# Patient Record
Sex: FEMALE | Race: WHITE
[De-identification: names, ages, dates, MRNs, and addresses within clinical notes are randomized per-mention and may not be internally consistent; named-entity substitution may affect disease eponyms.]

---

## 2019-04-12 ENCOUNTER — HOSPITAL ENCOUNTER (EMERGENCY)
Dept: HOSPITAL 59 - ER | Age: 69
Discharge: HOME | End: 2019-04-12
Payer: MEDICARE

## 2019-04-12 DIAGNOSIS — N89.8: ICD-10-CM
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
## 2019-04-12 DIAGNOSIS — S80.11XA: Primary | ICD-10-CM

## 2019-04-12 DIAGNOSIS — E11.9: ICD-10-CM

## 2019-04-12 DIAGNOSIS — I50.9: ICD-10-CM

## 2019-04-12 DIAGNOSIS — J44.9: ICD-10-CM

## 2019-04-12 DIAGNOSIS — N30.01: ICD-10-CM

## 2019-04-12 DIAGNOSIS — I10: ICD-10-CM

## 2019-04-12 LAB
ANION GAP SERPL CALC-SCNC: 13 MMOL/L (ref 7–16)
APPEARANCE UR: CLEAR
BASOPHILS NFR BLD: 0.6 % (ref 0–6)
BILIRUB UR-MCNC: NEGATIVE MG/DL
BUN SERPL-MCNC: 40 MG/DL (ref 8–23)
CO2 SERPL-SCNC: 25 MMOL/L (ref 22–29)
COLOR UR: YELLOW
CREAT SERPL-MCNC: 1.5 MG/DL (ref 0.5–0.9)
EOSINOPHIL NFR BLD: 1.4 % (ref 0–6)
EPI CELLS #/AREA URNS HPF: (no result) /[HPF]
ERYTHROCYTE [DISTWIDTH] IN BLOOD BY AUTOMATED COUNT: 18.7 % (ref 11.5–14.5)
EST GLOMERULAR FILTRATION RATE: 37 ML/MIN
GLUCOSE SERPL-MCNC: 288 MG/DL (ref 74–109)
GLUCOSE UR STRIP-MCNC: NEGATIVE MG/DL
GRANULOCYTES NFR BLD: 58.7 % (ref 47–80)
HCT VFR BLD CALC: 36.8 % (ref 35–47)
HGB BLD-MCNC: 11.2 GM/DL (ref 11.6–16)
KETONES UR QL STRIP: NEGATIVE
LYMPHOCYTES NFR BLD AUTO: 26.7 % (ref 16–45)
MCH RBC QN AUTO: 24.4 PG (ref 27–33)
MCHC RBC AUTO-ENTMCNC: 30.4 G/DL (ref 32–36)
MCV RBC AUTO: 80.2 FL (ref 81–97)
MONOCYTES NFR BLD: 12.6 % (ref 0–9)
NITRITE UR QL STRIP: NEGATIVE
PLATELET # BLD: 196 K/UL (ref 130–400)
PMV BLD AUTO: 10.7 FL (ref 7.4–10.4)
PROT UR QL STRIP: NEGATIVE
RBC # BLD AUTO: 4.59 M/UL (ref 3.8–5.4)
RBC # UR STRIP: (no result) /UL
URINE LEUKOCYTE ESTERASE: (no result)
UROBILINOGEN UR STRIP-ACNC: 0.2 E.U./DL (ref 0.2–1)
WBC # BLD AUTO: 7.1 K/UL (ref 4.2–12.2)

## 2019-04-12 PROCEDURE — 99283 EMERGENCY DEPT VISIT LOW MDM: CPT

## 2019-04-12 PROCEDURE — 85025 COMPLETE CBC W/AUTO DIFF WBC: CPT

## 2019-04-12 PROCEDURE — 81001 URINALYSIS AUTO W/SCOPE: CPT

## 2019-04-12 PROCEDURE — 80048 BASIC METABOLIC PNL TOTAL CA: CPT

## 2019-04-12 NOTE — EMERGENCY DEPARTMENT RECORD
History of Present Illness





- General


Chief Complaint: Fall Injury


Stated Complaint: FALL INJURY


Time Seen by Provider: 19 14:59


Source: Patient, RN notes reviewed


Mode of Arrival: Ambulatory





- History of Present Illness


Onset/Timin


-: Week(s)


When Fall Occurred: # Days PTA


Place Fall Occurred: Home


Loss of Consciousness: None


Prolonged Down Time?: No


Symptoms Prior to Fall: None


Severity: Mild


Severity scale (1-10): 8


Quality: Aching


Context: Tripped/slipped


Associated Symptoms: Denies





- Related Data


 Previous Rx's











 Medication  Instructions  Recorded


 


Cephalexin [Keflex] 500 mg PO QID #40 cap 19











 Allergies











Allergy/AdvReac Type Severity Reaction Status Date / Time


 


No Known Drug Allergies Allergy   Unverified 18 10:46














Travel Screening





- Travel/Exposure Within Last 30 Days


Have you traveled within the last 30 days?: No





Review of Systems


Constitutional: Reports: As per HPI.  Denies: Chills, Fever, Malaise, Night 

sweats, Weakness, Weight change


Eyes: Reports: As per HPI.  Denies: Eye discharge, Eye pain, Photophobia, 

Vision change


ENT: Reports: As per HPI.  Denies: Congestion, Dental pain, Ear pain, Epistaxis

, Hearing loss, Throat pain


Respiratory: Reports: As per HPI.  Denies: Cough, Dyspnea, Hemoptysis, Stridor, 

Wheezes


Cardiovascular: Reports: As per HPI.  Denies: Arrhythmia, Chest pain, Dyspnea 

on exertion, Edema, Murmurs, Orthopnea, Palpitations, Paroxysmal nocturnal 

dyspnea, Rheumatic Fever, Syncope


Endocrine: Reports: As per HPI.  Denies: Fatigue, Heat or cold intolerance, 

Polydipsia, Polyuria


Gastrointestinal: Reports: As per HPI.  Denies: Abdominal pain, Constipation, 

Diarrhea, Hematemesis, Hematochezia, Melena, Nausea, Vomiting


Genitourinary: Reports: As per HPI.  Denies: Abnormal menses, Discharge, 

Dyspareunia, Dysuria, Frequency, Hematuria, Incontinence, Retention, Urgency


Musculoskeletal: Reports: As per HPI.  Denies: Arthralgia, Back pain, Gout, 

Joint swelling, Myalgia, Neck pain


Skin: Reports: As per HPI.  Denies: Bruising, Change in color, Change in hair/

nails, Lesions, Pruritus, Rash


Neurological: Reports: As per HPI.  Denies: Abnormal gait, Confusion, Headache, 

Numbness, Paresthesias, Seizure, Tingling, Tremors, Vertigo, Weakness


Psychiatric: Reports: As per HPI.  Denies: Anxiety, Auditory hallucinations, 

Depression, Homicidal thoughts, Suicidal thoughts, Visual hallucinations


Hematological/Lymphatic: Reports: As per HPI.  Denies: Anemia, Blood Clots, 

Easy bleeding, Easy bruising, Swollen glands





Past Medical History





- SOCIAL HISTORY


Smoking Status: Never smoker





- RESPIRATORY


Hx Respiratory Disorders: Yes


Hx COPD: Yes





- CARDIOVASCULAR


Hx Cardio Disorders: Yes


Hx CHF: Yes


Hx Hypertension: Yes





- NEURO


Hx Neuro Disorders: Yes


Hx TIA: Yes





- GI


Hx GI Disorders: No





- 


Hx Genitourinary Disorders: No





- ENDOCRINE


Hx Endocrine Disorders: Yes


Hx Diabetes: Yes


Hx Thyroid Disease: Yes





- MUSCULOSKELETAL


Hx Musculoskeletal Disorders: Yes


Comment:: bilat. knee replacement





- PSYCH


Hx Psych Problems: Yes


Hx Depression: Yes





- HEMATOLOGY/ONCOLOGY


Hx Hematology/Oncology Disorders: No





Family Medical History


Any Significant Family History?: No





Course





 Vital Signs











  19





  14:51


 


Temperature 97.3 F L


 


Pulse Rate 60


 


Respiratory 16





Rate 


 


Blood Pressure 127/73


 


Pulse Ox 93 L














Medical Decision Making





- Lab Data


Result diagrams: 


 19 15:30





 19 15:30





 Lab Results











  19 Range/Units





  15:30 15:30 15:30 


 


WBC  7.1    (4.2-12.2)  K/uL


 


RBC  4.59    (3.80-5.40)  M/uL


 


Hgb  11.2 L    (11.6-16.0)  gm/dl


 


Hct  36.8    (35.0-47.0)  %


 


MCV  80.2 L    (81-97)  fl


 


MCH  24.4 L    (27-33)  pg


 


MCHC  30.4 L    (32-36)  g/dl


 


RDW  18.7 H    (11.5-14.5)  %


 


Plt Count  196    (130-400)  K/uL


 


MPV  10.7 H    (7.4-10.4)  fl


 


Gran %  58.7    (47-80)  %


 


Lymphocytes %  26.7    (16-45)  %


 


Monocytes %  12.6 H    (0-9)  %


 


Eosinophils %  1.4    (0-6)  %


 


Basophils %  0.6    (0-6)  %


 


Sodium    132 L  (136-145)  mmol/L


 


Potassium    4.6 H  (3.4-4.5)  mmol/L


 


Chloride    94 L  ()  mmol/L


 


Carbon Dioxide    25.0  (22-29)  mmol/L


 


Anion Gap    13.0  (7-16)  


 


BUN    40 H  (8-23)  mg/dL


 


Creatinine    1.5 H  (0.5-0.9)  mg/dL


 


Estimated GFR    37  mL/min


 


Random Glucose    288 H  ()  mg/dL


 


Calcium    9.1  (8.8-10.2)  mg/dL


 


Urine Color   Yellow   


 


Urine Appearance   Clear   


 


Urine pH   6.5   (5.0-8.0)  


 


Ur Specific Gravity   <= 1.005   (1.002-1.030)  


 


Urine Protein   Negative   (NEGATIVE)  


 


Urine Glucose (UA)   Negative   (NEGATIVE)  


 


Urine Ketones   Negative   (NEGATIVE)  


 


Urine Blood   Large H   (NEGATIVE)  


 


Urine Nitrite   Negative   (NEGATIVE)  


 


Urine Bilirubin   Negative   (NEGATIVE)  


 


Urine Urobilinogen   0.2   (0.20 - 1.00)  E.U./dL


 


Ur Leukocyte Esterase   Moderate H   (NEGATIVE)  


 


Urine RBC   10 - 15   (NONE SEEN)  


 


Urine WBC   10 - 15   (0-2/hpf)  


 


Ur Epithelial Cells   0 - 2   (FEW)  














Disposition


Clinical Impression: 


 Vaginal laceration, old





Hematoma of leg


Qualifiers:


 Encounter type: initial encounter Laterality: right Qualified Code(s): 

S80.11XA - Contusion of right lower leg, initial encounter





UTI (urinary tract infection)


Qualifiers:


 Urinary tract infection type: acute cystitis Hematuria presence: with 

hematuria Qualified Code(s): N30.01 - Acute cystitis with hematuria





Disposition: Home, Self-Care


Condition: (1) Good


Instructions:  Fall Prevention for Older Adults (ED)


Additional Instructions: 


neosporin ointment twice a day


follow up with family DR in one week


Prescriptions: 


Cephalexin [Keflex] 500 mg PO QID #40 cap


Forms:  Patient Portal Access


Time of Disposition: 16:13





Quality





- Quality Measures


Quality Measures: N/A





- Blood Pressure Screening


Does Patient Have Any of the Following: No, Active Dx of HTN


Blood Pressure Classification: Pre-Hypertensive BP Reading


Systolic Measurement: 127


Diastolic Measurement: 73


Screening for High Blood Pressure: Patient Exclusion, Hx of HTN []

## 2019-04-12 NOTE — EMERGENCY DEPARTMENT RECORD
History of Present Illness





- General


Chief Complaint: Fall Injury


Stated Complaint: FALL INJURY


Time Seen by Provider: 19 14:59


Source: Patient, RN notes reviewed


Mode of Arrival: Ambulatory





- History of Present Illness


Initial Comments: 


patient fell one week ago and seen at [facility] and she said her visiting nurse 

told her to go to the ED and have her leg checked because she was concerned 

about it being warm and she sees Dr Smith for history of hematuria and she 

had a knight while in the hospital. patient had a cystoscopy about 2 years ago. 

Her right leg hematoma looks good but she talks mention she sees blood on her 

under ware seen on and off since the fall.  Patient said at [facility] she was 

told she was low on iron. 





MD Complaint: Fall


Onset/Timin


-: Week(s)


When Fall Occurred: # Days PTA


Place Fall Occurred: Home


Loss of Consciousness: None


Prolonged Down Time?: No


Symptoms Prior to Fall: None


Severity: Mild


Severity scale (1-10): 8


Quality: Aching


Context: Tripped/slipped


Associated Symptoms: Denies





- Related Data


 Allergies











Allergy/AdvReac Type Severity Reaction Status Date / Time


 


No Known Drug Allergies Allergy   Unverified 18 10:46














Travel Screening





- Travel/Exposure Within Last 30 Days


Have you traveled within the last 30 days?: No





Review of Systems


Reviewed: No additional complaints except as noted below


Constitutional: Reports: As per HPI.  Denies: Chills, Fever, Malaise, Night 

sweats, Weakness, Weight change


Eyes: Reports: As per HPI.  Denies: Eye discharge, Eye pain, Photophobia, 

Vision change


ENT: Reports: As per HPI.  Denies: Congestion, Dental pain, Ear pain, Epistaxis

, Hearing loss, Throat pain


Respiratory: Reports: As per HPI.  Denies: Cough, Dyspnea, Hemoptysis, Stridor, 

Wheezes


Cardiovascular: Reports: As per HPI.  Denies: Arrhythmia, Chest pain, Dyspnea 

on exertion, Edema, Murmurs, Orthopnea, Palpitations, Paroxysmal nocturnal 

dyspnea, Rheumatic Fever, Syncope


Endocrine: Reports: As per HPI.  Denies: Fatigue, Heat or cold intolerance, 

Polydipsia, Polyuria


Gastrointestinal: Reports: As per HPI.  Denies: Abdominal pain, Constipation, 

Diarrhea, Hematemesis, Hematochezia, Melena, Nausea, Vomiting


Genitourinary: Reports: As per HPI.  Denies: Abnormal menses, Discharge, 

Dyspareunia, Dysuria, Frequency, Hematuria, Incontinence, Retention, Urgency


Musculoskeletal: Reports: As per HPI.  Denies: Arthralgia, Back pain, Gout, 

Joint swelling, Myalgia, Neck pain


Skin: Reports: As per HPI.  Denies: Bruising, Change in color, Change in hair/

nails, Lesions, Pruritus, Rash


Neurological: Reports: As per HPI.  Denies: Abnormal gait, Confusion, Headache, 

Numbness, Paresthesias, Seizure, Tingling, Tremors, Vertigo, Weakness


Psychiatric: Reports: As per HPI.  Denies: Anxiety, Auditory hallucinations, 

Depression, Homicidal thoughts, Suicidal thoughts, Visual hallucinations


Hematological/Lymphatic: Reports: As per HPI.  Denies: Anemia, Blood Clots, 

Easy bleeding, Easy bruising, Swollen glands





Past Medical History





- SOCIAL HISTORY


Smoking Status: Never smoker





- RESPIRATORY


Hx Respiratory Disorders: Yes


Hx COPD: Yes





- CARDIOVASCULAR


Hx Cardio Disorders: Yes


Hx CHF: Yes


Hx Hypertension: Yes





- NEURO


Hx Neuro Disorders: Yes


Hx TIA: Yes





- GI


Hx GI Disorders: No





- 


Hx Genitourinary Disorders: No





- ENDOCRINE


Hx Endocrine Disorders: Yes


Hx Diabetes: Yes


Hx Thyroid Disease: Yes





- MUSCULOSKELETAL


Hx Musculoskeletal Disorders: Yes


Comment:: bilat. knee replacement





- PSYCH


Hx Psych Problems: Yes


Hx Depression: Yes





- HEMATOLOGY/ONCOLOGY


Hx Hematology/Oncology Disorders: No





Family Medical History


Any Significant Family History?: No





Physical Exam





- General


General Appearance: Alert, Oriented x3, Cooperative, No acute distress





- Head


Head exam: Normal inspection





- Eye


Eye exam: Normal appearance, PERRL


Pupils: Normal accommodation





- ENT


ENT exam: Normal exam, Mucous membranes moist, Normal external ear exam, Normal 

orophraynx, TM's normal bilaterally


Ear exam: Normal external inspection.  negative: External canal tenderness


Nasal Exam: Normal inspection.  negative: Discharge, Sinus tenderness


Mouth exam: Normal external inspection, Tongue normal


Teeth exam: Normal inspection.  negative: Dental caries


Throat exam: Normal inspection.  negative: Tonsillar erythema, Tonsillar exudate





- Neck


Neck exam: Normal inspection, Full ROM.  negative: Tenderness





- Respiratory


Respiratory exam: Normal lung sounds bilaterally.  negative: Respiratory 

distress





- Cardiovascular


Cardiovascular Exam: Regular rate, Normal rhythm, Normal heart sounds





- GI/Abdominal


GI/Abdominal exam: Soft, Normal bowel sounds.  negative: Tenderness





- Rectal


Rectal exam: Deferred





- 


 exam: Abnormal external exam (small skin tear on the right side of vagina 

and probably the reason for the bleeding,external vagina between labia majoria 

and minoria), Normal speculum exam (no bleeding from vagina now )





- Extremities


Extremities exam: Normal inspection, Full ROM, Normal capillary refill.  

negative: Tenderness





- Back


Back exam: Reports: Normal inspection, Full ROM.  Denies: Muscle spasm, Rash 

noted, Tenderness





- Neurological


Neurological exam: Alert, Normal gait, Oriented X3, Reflexes normal





- Psychiatric


Psychiatric exam: Normal affect, Normal mood





- Skin


Skin exam: Dry, Intact, Normal color, Warm





Course





 Vital Signs











  19





  14:51


 


Temperature 97.3 F L


 


Pulse Rate 60


 


Respiratory 16





Rate 


 


Blood Pressure 127/73


 


Pulse Ox 93 L














- Reevaluation(s)


Reevaluation #1: 





19 16:04


no sutures required





Medical Decision Making





- Lab Data


Result diagrams: 


 19 15:30





 19 15:30





Disposition


Clinical Impression: 


 Vaginal laceration, old





Hematoma of leg


Qualifiers:


 Encounter type: initial encounter Laterality: right Qualified Code(s): 

S80.11XA - Contusion of right lower leg, initial encounter





Disposition: Home, Self-Care


Condition: (1) Good


Instructions:  Fall Prevention for Older Adults (ED)


Additional Instructions: 


neosporin ointment twice a day


follow up with family DR in one week


Forms:  Patient Portal Access


Time of Disposition: 16:05





Quality





- Quality Measures


Quality Measures: N/A





- Blood Pressure Screening


Does Patient Have Any of the Following: No, Active Dx of HTN


Blood Pressure Classification: Pre-Hypertensive BP Reading


Systolic Measurement: 127


Diastolic Measurement: 73


Screening for High Blood Pressure: Patient Exclusion, Hx of HTN []